# Patient Record
Sex: MALE | Race: WHITE
[De-identification: names, ages, dates, MRNs, and addresses within clinical notes are randomized per-mention and may not be internally consistent; named-entity substitution may affect disease eponyms.]

---

## 2020-07-06 ENCOUNTER — HOSPITAL ENCOUNTER (OUTPATIENT)
Dept: HOSPITAL 74 - FASU-ENDO | Age: 60
Discharge: HOME | End: 2020-07-06
Attending: INTERNAL MEDICINE
Payer: COMMERCIAL

## 2020-07-06 VITALS — SYSTOLIC BLOOD PRESSURE: 127 MMHG | DIASTOLIC BLOOD PRESSURE: 77 MMHG | HEART RATE: 84 BPM

## 2020-07-06 VITALS — TEMPERATURE: 98.4 F

## 2020-07-06 VITALS — BODY MASS INDEX: 27.3 KG/M2

## 2020-07-06 DIAGNOSIS — Z12.11: Primary | ICD-10-CM

## 2020-07-06 DIAGNOSIS — K57.30: ICD-10-CM

## 2020-07-06 PROCEDURE — 0DJD8ZZ INSPECTION OF LOWER INTESTINAL TRACT, VIA NATURAL OR ARTIFICIAL OPENING ENDOSCOPIC: ICD-10-PCS | Performed by: INTERNAL MEDICINE

## 2020-09-13 ENCOUNTER — HOSPITAL ENCOUNTER (EMERGENCY)
Dept: HOSPITAL 74 - FER | Age: 60
Discharge: HOME | End: 2020-09-13
Payer: COMMERCIAL

## 2020-09-13 VITALS — BODY MASS INDEX: 27.3 KG/M2

## 2020-09-13 VITALS — HEART RATE: 63 BPM | DIASTOLIC BLOOD PRESSURE: 64 MMHG | SYSTOLIC BLOOD PRESSURE: 102 MMHG | TEMPERATURE: 97.8 F

## 2020-09-13 DIAGNOSIS — R42: Primary | ICD-10-CM

## 2020-09-13 DIAGNOSIS — E86.0: ICD-10-CM

## 2020-09-13 LAB
ALBUMIN SERPL-MCNC: 3.3 G/DL (ref 3.4–5)
ALP SERPL-CCNC: 112 U/L (ref 45–117)
ALT SERPL-CCNC: 89 U/L (ref 13–61)
ANION GAP SERPL CALC-SCNC: 12 MMOL/L (ref 8–16)
APTT BLD: 25.7 SECONDS (ref 25.2–36.5)
AST SERPL-CCNC: 105 U/L (ref 15–37)
BASOPHILS # BLD: 0.5 % (ref 0–2)
BILIRUB SERPL-MCNC: 1.4 MG/DL (ref 0.2–1)
BNP SERPL-MCNC: 390.1 PG/ML (ref 5–125)
BUN SERPL-MCNC: 18 MG/DL (ref 7–18)
CALCIUM SERPL-MCNC: 8.9 MG/DL (ref 8.5–10)
CHLORIDE SERPL-SCNC: 98 MMOL/L (ref 98–107)
CO2 SERPL-SCNC: 21 MMOL/L (ref 21–32)
CREAT SERPL-MCNC: 1.7 MG/DL (ref 0.55–1.3)
DEPRECATED RDW RBC AUTO: 13.1 % (ref 11.9–15.9)
EOSINOPHIL # BLD: 0 % (ref 0–4.5)
EPITH CASTS URNS QL MICRO: (no result) /HPF
GLUCOSE SERPL-MCNC: 161 MG/DL (ref 74–106)
HCT VFR BLD CALC: 43.2 % (ref 35.4–49)
HGB BLD-MCNC: 14.4 GM/DL (ref 11.7–16.9)
INR BLD: 1.25 (ref 0.82–1.09)
LYMPHOCYTES # BLD: 13 % (ref 8–40)
MCH RBC QN AUTO: 30.1 PG (ref 25.7–33.7)
MCHC RBC AUTO-ENTMCNC: 33.4 G/DL (ref 32–35.9)
MCV RBC: 90.1 FL (ref 80–96)
MONOCYTES # BLD AUTO: 9.1 % (ref 3.8–10.2)
NEUTROPHILS # BLD: 77.4 % (ref 42.8–82.8)
PLATELET # BLD AUTO: 195 K/MM3 (ref 134–434)
PMV BLD: 9.3 FL (ref 7.5–11.1)
POTASSIUM SERPLBLD-SCNC: 3.7 MMOL/L (ref 3.5–5.1)
PROT SERPL-MCNC: 6.9 G/DL (ref 6.4–8.2)
PT PNL PPP: 13.9 SEC (ref 10.2–13)
RBC # BLD AUTO: 4.8 M/MM3 (ref 4–5.6)
SODIUM SERPL-SCNC: 131 MMOL/L (ref 136–145)
WBC # BLD AUTO: 7.5 K/MM3 (ref 4–10.8)

## 2020-09-13 PROCEDURE — 3E0337Z INTRODUCTION OF ELECTROLYTIC AND WATER BALANCE SUBSTANCE INTO PERIPHERAL VEIN, PERCUTANEOUS APPROACH: ICD-10-PCS | Performed by: EMERGENCY MEDICINE

## 2020-09-13 NOTE — PDOC
History of Present Illness





- General


Chief Complaint: Lightheaded


Stated Complaint: LIGHTHEADED


Time Seen by Provider: 09/13/20 12:07





- History of Present Illness


Initial Comments: 





09/13/20 12:51


60 M with h/o DM, HTN, HLD presenting to ED with lightheadedness and chills. Pt 

states that 2 days ago he began to have chills and rigors. Denies ever spiking a

fever. He notes he had a mild cough yesterday that has resolved. Today, pt was 

in [place] when he began to feel very lightheaded, prompting him to come to the 

ER. Denies LOC. Pt denies any N/V/D. Endorses poor PO over the past 2 days due 

to lack of appetite. Denies abdominal pain. Pt is taking 2 HTN meds - dilt and 

losartan/HCTZ.





Past History





- Medical History


Allergies/Adverse Reactions: 


                                    Allergies











Allergy/AdvReac Type Severity Reaction Status Date / Time


 


No Known Allergies Allergy   Verified 09/13/20 12:01











Home Medications: 


Ambulatory Orders





Aspirin [Ecotrin] 81 mg PO DAILY 07/01/20 


Cholecalciferol (Vitamin D3) [Vitamin D3] 50 mcg PO DAILY 07/01/20 


Diltiazem Cd [Cardizem Cd -] 300 mg PO DAILY 07/01/20 


Ezetimibe 10 mg PO DAILY 07/01/20 


Gemfibrozil 600 mg PO BID 07/01/20 


Losartan/Hydrochlorothiazide [Losartan-Hctz 100-12.5 mg Tab] 1 each PO DAILY 

07/01/20 


Rosuvastatin [Crestor -] 20 mg PO DAILY 07/01/20 


metFORMIN HCL [Glucophage -] 500 mg PO BID 07/01/20 








Anemia: No


Asthma: No


Cancer: No


Cardiac Disorders: No


CVA: No


COPD: No


CHF: No


Dementia: No


Diabetes: Yes


GI Disorders: No


 Disorders: No


HTN: Yes


Hypercholesterolemia: Yes


Liver Disease: No


Seizures: No


Thyroid Disease: No





- Surgical History


Abdominal Surgery: Yes (BILATERAL INGUINAL HERNIA REPAIRS)


Appendectomy: No


Cardiac Surgery: No


Cholecystectomy: No


Lung Surgery: Yes (RIGHT LOBECTOMY)


Neurologic Surgery: No





- Psycho-Social/Smoking History


Smoking History: Never smoked


Have you smoked in the past 12 months: No


Information on smoking cessation initiated: No





- Substance Abuse Hx (Audit-C & DAST Scrn)


How often the patient has a drink containing alcohol: Never


Score: In Men: 4 or > Positive; In Women: 3 or > Positive: 0


Screen Result (Pos requires Nsg. Audit-10AR): Negative


In the last yr the pt used illegal drug/Rx for NonMed reason: No


Score:  Yes response is considered Positive: 0


Screen Result (Positive result requires Nsg. DAST-10): Negative





**Review of Systems





- Review of Systems


Comments:: 





09/13/20 12:54


GENERAL/CONSTITUTIONAL: + chills, + lightheadedness, No fever 


HEAD, EYES, EARS, NOSE AND THROAT: No change in vision. No ear pain or 

discharge. No sore throat.


CARDIOVASCULAR: No chest pain, no shortness of breath, no loss of consciousness


RESPIRATORY: No cough, wheezing, or hemoptysis.


GASTROINTESTINAL: No nausea, vomiting, diarrhea or constipation.


GENITOURINARY: No dysuria, frequency, or change in urination.


MUSCULOSKELETAL: No joint or muscle swelling or pain. No neck or back pain.


SKIN: No rash


NEUROLOGIC: No vertigo, no change in strength/sensation.


ENDOCRINE: No increased thirst. No abnormal weight change.


HEMATOLOGIC/LYMPHATIC: No anemia, easy bleeding, or history of blood clots.


ALLERGIC/IMMUNOLOGIC: No hives or skin allergy.





*Physical Exam





- Vital Signs


                                Last Vital Signs











Temp Pulse Resp BP Pulse Ox


 


 98.1 F   63   18   76/46 L  99 


 


 09/13/20 12:00  09/13/20 12:00  09/13/20 12:00  09/13/20 12:00  09/13/20 12:00














- Physical Exam





09/13/20 12:55


"GENERAL: Awake, alert, and fully oriented, in no acute distress.


HEAD: No signs of trauma


EYES: PERRLA, EOMI, sclera anicteric, conjunctiva clear


ENT: Auricles normal inspection, hearing grossly normal, nares patent, 

oropharynx clear without exudates. Moist mucosa


NECK: Nontender, no stepoffs, Normal ROM, supple, no lymphadenopathy, JVD, or 

masses


LUNGS: Breath sounds equal, clear to auscultation bilaterally.  No wheezes, and 

no crackles


HEART: Regular rate and rhythm, normal S1 and S2, no murmurs, rubs or gallops


ABDOMEN: Soft, nontender, normoactive bowel sounds.  No guarding, no rebound.  

No masses


EXTREMITIES: Normal range of motion, no edema.  No clubbing or cyanosis. No 

cords, erythema, or tenderness


NEUROLOGICAL: Cranial nerves II through XII intact. 5/5 strength and sensation 

in all extremities, Normal speech, normal gait, normal cerebellar function


SKIN: Warm, Dry, normal turgor, no rashes or lesions noted.





ED Treatment Course





- LABORATORY


CBC & Chemistry Diagram: 


                                 09/13/20 12:30





                                 09/13/20 12:30





- ADDITIONAL ORDERS


Additional order review: 


                                        











  09/13/20





  12:30


 


RBC  4.80


 


MCV  90.1


 


MCHC  33.4


 


RDW  13.1


 


MPV  9.3


 


Neutrophils %  77.4


 


Lymphocytes %  13.0


 


Monocytes %  9.1


 


Eosinophils %  0.0


 


Basophils %  0.5














Medical Decision Making





- Medical Decision Making





09/13/20 12:55


60 M with chills and lightheadedness. Pt hypotensive in ED but not tachycardic. 

Possibly 2/2 calcium channel blocker. Pt afebrile in ED but will evaluate for 

infectious process. Also may be volume depleted 2/2 poor PO intake.


- labs


- CXR, UA


- IV fluids





09/13/20 14:52


CXR with no acute process


Labs notable for mild ERNIE, likely 2/2 dehydration


Pt also with mild transaminitis. No abdominal pain or tenderness on exam.





Pt reassessed - states he feels much better after fluids.


Repeat BP now normal.





09/13/20 15:20


Pt ambulatory in ED with no lightheadedness, now asymptomatic. Vitals stable


Pt is well appearing, with normal vitals. Clinically stable for DC at this time.


I discussed the physical exam findings, ancillary test results and final 

diagnoses with the patient. I answered all of the patient's questions. The 

patient was satisfied with the care received and felt comfortable with the 

discharge plan and treatment plan.  The patient agrees to follow up with the 

primary care physician within 24-72 hours.








Discharge





- Discharge Information


Problems reviewed: Yes


Clinical Impression/Diagnosis: 


 Lightheadedness, Dehydration





Condition: Stable


Disposition: HOME





- Follow up/Referral





- Patient Discharge Instructions


Patient Printed Discharge Instructions:  DI for Dehydration -- Adult


Additional Instructions: 


Your lightheadedness is likely due to dehydration. Drink plenty of fluids to 

stay hydrated.


Your chills may be due to an infection.


If you experience recurrent lightheadedness, fevers, abdominal pain, chest pain,

cough, or any other concerning symptoms, return to the ER immediately.





Your bloodwork today showed slightly elevated liver enzymes. Please follow up 

with your primary doctor within 1 week to have this rechecked.





- Post Discharge Activity

## 2020-09-14 NOTE — EKG
Test Reason : 

Blood Pressure : ***/*** mmHG

Vent. Rate : 055 BPM     Atrial Rate : 055 BPM

   P-R Int : 200 ms          QRS Dur : 086 ms

    QT Int : 428 ms       P-R-T Axes : 059 039 050 degrees

   QTc Int : 409 ms

 

SINUS BRADYCARDIA

OTHERWISE NORMAL ECG

WHEN COMPARED WITH ECG OF 26-NOV-2003 09:18,

NO SIGNIFICANT CHANGE WAS FOUND

Confirmed by AURORA HERNANDEZ MD ([id number]) on 9/14/2020 11:41:11 AM

 

Referred By: HOSEA CROW           Confirmed By:AURORA HERNANDEZ MD